# Patient Record
Sex: FEMALE | Race: WHITE | NOT HISPANIC OR LATINO | URBAN - METROPOLITAN AREA
[De-identification: names, ages, dates, MRNs, and addresses within clinical notes are randomized per-mention and may not be internally consistent; named-entity substitution may affect disease eponyms.]

---

## 2017-04-18 ENCOUNTER — OFFICE VISIT - RIVER FALLS (OUTPATIENT)
Dept: FAMILY MEDICINE | Facility: CLINIC | Age: 67
End: 2017-04-18

## 2017-04-18 ASSESSMENT — MIFFLIN-ST. JEOR: SCORE: 1306.17

## 2022-02-11 VITALS
WEIGHT: 174 LBS | HEIGHT: 65 IN | BODY MASS INDEX: 28.99 KG/M2 | TEMPERATURE: 97.7 F | SYSTOLIC BLOOD PRESSURE: 118 MMHG | HEART RATE: 64 BPM | DIASTOLIC BLOOD PRESSURE: 70 MMHG | RESPIRATION RATE: 16 BRPM

## 2022-02-16 NOTE — PROGRESS NOTES
Patient:   ROBBI SINGH            MRN: [id number]            FIN: [id number]               Age:   66 years     Sex:  Female     :  1950   Associated Diagnoses:   Tick bite   Author:   Duane Decker PA-C      Chief Complaint   2017 4:46 PM CDT    Pt here for embedded deer tcik bite near left elbow that she pulled out last night        History of Present Illness   Chief complaint and symptoms noted above and confirmed with patient   as above  deer ticked was embedded near left elbow, removed it last night         Health Status   Allergies:    Allergic Reactions (Selected)  No Known Medication Allergies   Medications:  (Selected)   Documented Medications  Documented  Cozaar: ( 100 mg ), PO, Daily, 0 Refill(s), Type: Maintenance  levothyroxine 50 mcg (0.05 mg) oral capsule: 1 cap(s) ( 50 mcg ), po, daily, 0 Refill(s), Type: Maintenance  simvastatin: ( 40 mg ), PO, hs, 0 Refill(s), Type: Maintenance   Problem list:    All Problems (Selected)  Hepatitis A without Hepatic Coma / ICD-9-.1 / Confirmed  Hypertension / SNOMED CT 6873568284 / Confirmed  Hypothyroidism (acquired) / SNOMED CT 731354221 / Confirmed  Diverticulosis of sigmoid colon / SNOMED CT 3162778250 / Confirmed  Dyslipidemia / ICD-9-.4 / Confirmed  Obesity / ICD-9-.00 / Probable  TGA (Transient Global Amnesia) / ICD-9-.7 / Confirmed  Fatty Liver / ICD-9-.8 / Confirmed  Osteopenia / ICD-9-.90 / Confirmed  Cavernous Hemangioma / ICD-9-.32 / Confirmed  Pre-Diabetes / ICD-9-.29 / Confirmed  ASCUS on PAP Smear / ICD-9-.01 / Confirmed  Postmenopause / ICD-9-CM V49.81 / Confirmed      Histories   Past Medical History:    Active  TGA (Transient Global Amnesia) (437.7): Onset on 10/9/2011 at 61 years.  Comments:  3/1/2012 CST 11:06 AM CST - Eh DELACRUZ, Phyllis  Lasted 10 hrs. Hospitalized @ [place] overnight. Tests negative.    2015 CDT 11:55 AM CDT - Deni Hoyos MD 2015  the day after colonoscopy  Diverticulosis of sigmoid colon (9901984405): Onset on 2005 at 55 years.  Hepatitis A without Hepatic Coma (070.1): Onset on 2005 at 54 years.  Comments:  2010 CST 4:10 PM CST - Aby Cynthia MAGALLANES  Chronic  Postmenopause (V49.81): Onset in  at 51 years.  ASCUS on PAP Smear (795.01): Onset on 2000 at 49 years.  Cavernous Hemangioma (757.32): Onset in  at 48 years.  Comments:  2/15/2011 CST 10:27 AM CST - Scottie  Mlau  Supracellular  Hypertension (7206460829)  Hypothyroidism (acquired) (619587333)  Osteopenia (733.90)  Dyslipidemia (272.4)  Pre-Diabetes (790.29)  Fatty Liver (571.8)  Resolved  *Hospitalized@[facility] - Transient global amnesia: Onset on 10/9/2011 at 61 years.  Resolved.  Menarche (V21.8): Onset in  at 11 years.  Resolved.  Pregnancy (730833240):  Resolved in  at 25 years.  Pregnancy (444453166):  Resolved in  at 31 years.  Pregnancy (284602943):  Resolved in  at 32 years.  Pregnancy (552595518):  Resolved in  at 35 years.   Family History:    Diabetes mellitus  Brother  Diabetes mellitus type 2  Father ()  Uncle (P)  Osteoporosis  Mother ()  Myocardial infarction  Father ()  Gallbladder disease  Mother ()  Crohn's disease  Brother  Parkinsonism  Mother ()  Cancer of liver..  Aunt (M) (General Family Hx, )     Procedure history:    Colonoscopy (051102168) on 2015 at 64 Years.  Comments:  2015 12:01 PM - Deni Hoyos MD  Indication: Screening  Sedation: 3 mg Versed and 150 mcg Fentanyl IV  Findings: Left-sided diverticulosis, Otherwise normal  Recommendation: Repeat in 10 years  HPV - Human papillomavirus test negative (3209759139) on 2014 at 63 Years.  Comments:  4/15/2014 4:31 PM - Eh DELACRUZ, Phyllis  Hx of 5+ lifetime partners so favor 3 yr screening interval.  DEXA - Dual energy X-ray photon absorptiometry (869707954) on 2011 at 60  Years.  Comments:  2011 8:58 AM - Phyllis Martinez  Stable osteopenia. Repeat 5 yrs. (Due )  HPV - Human papillomavirus test negative (0568980581) on 2/15/2011 at 60 Years.  Comments:  2011 8:28 PM - Phyllis Martinez  Low risk for cervical cancer. OK to have pap q 3 yrs.  DEXA - Dual energy X-ray photon absorptiometry (399016615) on 2009 at 58 Years.  Comments:  2/15/2011 5:35 PM - Phyllis Martinez  Declining osteopenia. Repeat 1-2 yrs.  BSO - Bilateral salpingo-oophorectomy (3825491626) on 2007 at 56 Years.  Lipoma removal from left thigh on 2007 at 56 Years.  Colonoscopy (915444870) on 2005 at 55 Years.  Comments:  2/15/2011 5:35 PM - Eh ODOM-Phyllis BLUE  OK to repeat in 10 yrs per assisted. (Due )    2/15/2011 10:21 AM - Malu Ware  Sigmoid diverticulosis.  Childbirth (0846455287) in the month of 1986 at 36 Years.  Comments:  2/15/2011 10:05 AM - Malu Ware (female)  Miscarriage (31239622) in  at 35 Years.  Childbirth (0592731726) in the month of 10/1983 at 33 Years.  Comments:  2/15/2011 10:06 AM - Malu Ware (Female)  Childbirth (7861259881) in the month of 1982 at 31 Years.  Comments:  2/15/2011 10:06 AM - Malu Ware (Female)  Childbirth (4979870458) in the month of 1979 at 29 Years.  Comments:  2/15/2011 10:06 AM - Malu Ware (male)   Social History:        Alcohol Assessment: Current            Wine (5 oz), 1-2 times per month, 2 drinks/episode average.      Tobacco Assessment: Denies Tobacco Use            Never      Substance Abuse Assessment: Denies Substance Abuse            Never      Employment and Education Assessment            Employed, Work/School description: .      Home and Environment Assessment            Marital status: .  Spouse/Partner name: Good.      Exercise and Physical Activity Assessment: Regular exercise            Exercise  frequency: Daily.  Exercise type: Running, Walking.      Sexual Assessment            Sexually active: Yes.  Sexual orientation: Heterosexual.      Other Assessment            Marital Status,         Physical Examination   Vital Signs   4/18/2017 4:46 PM CDT Temperature Tympanic 97.7 DegF  LOW    Peripheral Pulse Rate 64 bpm    Pulse Site Radial artery    Respiratory Rate 16 br/min    Systolic Blood Pressure 118 mmHg    Diastolic Blood Pressure 70 mmHg    Mean Arterial Pressure 86 mmHg    BP Site Right arm      General:  No acute distress.    Musculoskeletal:  1 cm red lesion with central ulceration on inside of left elbow, no drainage.       Impression and Plan   Diagnosis     Tick bite (UKS41-DI W57.XXXA).     Summary:  he is given 200 mg doxycycline to take once as Lyme prophylaxis, with a few extra to use for future tick bites..    Orders     Orders   Pharmacy:  doxycycline hyclate 100 mg oral delayed release tablet (Prescribe): See Instructions, Instructions: 2 tab(s) po once after embedded tick  bite    may substitute for cheapest form of doxycycline, # 10 EA, 0 Refill(s), Type: Maintenance, Pharmacy: [id number], 2 tab(s) po once after embedded tick  bite; may substitute for cheapest form of doxycycline.     Orders   Charges (Evaluation and Management):  [id number] office outpatient visit 15 minutes (Charge) (Order): Quantity: 1, Tick bite.